# Patient Record
Sex: FEMALE | Race: OTHER | HISPANIC OR LATINO
[De-identification: names, ages, dates, MRNs, and addresses within clinical notes are randomized per-mention and may not be internally consistent; named-entity substitution may affect disease eponyms.]

---

## 2021-03-29 ENCOUNTER — APPOINTMENT (OUTPATIENT)
Dept: GASTROENTEROLOGY | Facility: CLINIC | Age: 56
End: 2021-03-29

## 2021-06-07 ENCOUNTER — RESULT REVIEW (OUTPATIENT)
Age: 56
End: 2021-06-07

## 2022-04-01 ENCOUNTER — EMERGENCY (EMERGENCY)
Facility: HOSPITAL | Age: 57
LOS: 1 days | Discharge: HOME | End: 2022-04-01
Attending: EMERGENCY MEDICINE
Payer: COMMERCIAL

## 2022-04-01 VITALS
TEMPERATURE: 98 F | DIASTOLIC BLOOD PRESSURE: 82 MMHG | OXYGEN SATURATION: 100 % | RESPIRATION RATE: 20 BRPM | SYSTOLIC BLOOD PRESSURE: 119 MMHG | HEART RATE: 69 BPM

## 2022-04-01 VITALS
WEIGHT: 207.9 LBS | DIASTOLIC BLOOD PRESSURE: 81 MMHG | HEART RATE: 85 BPM | HEIGHT: 64 IN | RESPIRATION RATE: 19 BRPM | SYSTOLIC BLOOD PRESSURE: 147 MMHG | OXYGEN SATURATION: 97 % | TEMPERATURE: 98 F

## 2022-04-01 PROCEDURE — 73030 X-RAY EXAM OF SHOULDER: CPT

## 2022-04-01 PROCEDURE — 73060 X-RAY EXAM OF HUMERUS: CPT

## 2022-04-01 PROCEDURE — 73030 X-RAY EXAM OF SHOULDER: CPT | Mod: 26,LT

## 2022-04-01 PROCEDURE — 70450 CT HEAD/BRAIN W/O DYE: CPT | Mod: MA

## 2022-04-01 PROCEDURE — 70450 CT HEAD/BRAIN W/O DYE: CPT | Mod: 26,MA

## 2022-04-01 PROCEDURE — 73000 X-RAY EXAM OF COLLAR BONE: CPT

## 2022-04-01 PROCEDURE — 73502 X-RAY EXAM HIP UNI 2-3 VIEWS: CPT | Mod: 26,LT

## 2022-04-01 PROCEDURE — 99284 EMERGENCY DEPT VISIT MOD MDM: CPT | Mod: 25

## 2022-04-01 PROCEDURE — 99284 EMERGENCY DEPT VISIT MOD MDM: CPT

## 2022-04-01 PROCEDURE — 73502 X-RAY EXAM HIP UNI 2-3 VIEWS: CPT

## 2022-04-01 PROCEDURE — 73000 X-RAY EXAM OF COLLAR BONE: CPT | Mod: 26,LT

## 2022-04-01 PROCEDURE — 73020 X-RAY EXAM OF SHOULDER: CPT

## 2022-04-01 PROCEDURE — 73060 X-RAY EXAM OF HUMERUS: CPT | Mod: 26,LT

## 2022-04-01 RX ORDER — ACETAMINOPHEN 500 MG
975 TABLET ORAL ONCE
Refills: 0 | Status: COMPLETED | OUTPATIENT
Start: 2022-04-01 | End: 2022-04-01

## 2022-04-01 RX ADMIN — Medication 975 MILLIGRAM(S): at 11:23

## 2022-04-01 RX ADMIN — Medication 975 MILLIGRAM(S): at 11:55

## 2022-04-01 NOTE — ED PROVIDER NOTE - ATTENDING CONTRIBUTION TO CARE
57F hx hypothyroid no AC or AP here sp slip and fall down ~10 steps with head strike, no LOC, no neck pain, c/o L shoulder and L hip pain, has been ambulatory. L parietal swelling no lac, no midline C spine TTP, no chest wall TTP, L proximal humerus deformity and TTP, distal 1/3 L clavicle TTP, FROM in L wrist elbow, dec ROM in L shoulder 2/2 pain. abd soft ntnd, pelvis stable, L posterior hip/glute TTP and bruising, FROM in BLE LE. CTH to r/o ICH, Xrays L shoulder and pelvis to r/o fx/dislocation, pain control.

## 2022-04-01 NOTE — ED PROVIDER NOTE - PHYSICAL EXAMINATION
General appearance: NAD, conversant, afebrile    Eyes: anicteric sclerae, LUIS, EOMI   HENT: L head hematoma; oropharynx clear, MMM and no ulcerations, no pharyngeal erythema or exudate   Neck: Trachea midline; Full range of motion, supple   Pulm: CTA bl, normal respiratory effort and no intercostal retractions, normal work of breathing   CV: RRR, No murmurs, rubs, or gallops.    Abdomen: Soft, non-tender, non-distended; no guarding or rebound   Extremities: L shoulder ROM limited to 90 deg lateral and anterior direction. difficult to assess deformity due to body habitus however asymmetrical to R shoulder. TTP anterior shoulder and distal clavicle. 5/5 strength in hand  and elbow, exam limited by pain for shoulder. L hip bruise, full rom, L lateral shin bruise, no bony ttp. 5/5 strength all other extremities, full ROM   Skin: Dry, normal temperature, turgor and texture; no rash, ulcers or subcutaneous nodules   Psych: Appropriate affect, cooperative; alert and oriented to person, place and time

## 2022-04-01 NOTE — ED PROVIDER NOTE - NSICDXPASTSURGICALHX_GEN_ALL_CORE_FT
PAST SURGICAL HISTORY:  FHx: AVERY-BSO (total abdominal hysterectomy and bilateral salpingo-oophorectomy) 3/1999    H/O colonoscopy with polypectomy 2013    Knowledge deficit on leg surgery 1995 - left femur - surgical excision of excess bony growth

## 2022-04-01 NOTE — ED PROVIDER NOTE - WR ORDER ID 3
All discharge, safety  and follow up with Md, verbalized understanding of all instructions [id number]

## 2022-04-01 NOTE — ED PROVIDER NOTE - NSICDXPASTMEDICALHX_GEN_ALL_CORE_FT
PAST MEDICAL HISTORY:  Cholelithiasis     Endometriosis     Hypothyroidism was on levothyroxine, has not taken since 3/2013 pending evaluation by PCP    Obesity (BMI 30-39.9)

## 2022-04-01 NOTE — ED PROVIDER NOTE - CARE PROVIDER_API CALL
Steve Danielle)  Orthopedics  [address] [place] [address]  Phone: [phone]  Fax: [phone]  Follow Up Time: 4-6 Days

## 2022-04-01 NOTE — ED PROVIDER NOTE - OBJECTIVE STATEMENT
58yo F w/ hx hypothyroidism presenting with shoulder pain. Pt fell down 10 stairs this morning. Hit head but no LOC, no AC use. She now has HA, nausea, L shoulder pain, and L hip pain. Ambulatory after fall and no dizziness. 58yo F w/ hx hypothyroidism presenting with shoulder pain. Pt fell down 10 carpeted wooden stairs this morning. Hit head but no LOC, no AC use. She now has HA, nausea, L shoulder pain, and L hip pain. Ambulatory after fall and no dizziness. No other complaints.

## 2022-04-01 NOTE — ED ADULT NURSE NOTE - NS ED NURSE RECORD ANOTHER VITAL SIGN
Pt is calling back asking if she needs to go back on the Calcium or not?  She was confused.  Please call [phone]   Yes

## 2022-04-01 NOTE — CONSULT NOTE ADULT - SUBJECTIVE AND OBJECTIVE BOX
IN PROGRESS Orthopedics    Patient is a 57yFemale RHD who presents to [facility] ED w/ a c/o of L shoulder pain and L buttock pain after falling down some stairs this morning. Denies preceding CP/SOB/headache/confusion/dizziness/palitations/weakness/fatigue. Endrosed Head trauma but no LOC. States ability to walk immediately following the injury. Denies any numbness or tingling. Denies any previous orthopedic history. No other orthopedic concerns at this time.    Hypothyroidism    Cholelithiasis    Endometriosis    Obesity (BMI 30-39.9)      No Known Allergies      PHYSICAL EXAM:  T(C): 36.7 (04-01-22 @ 13:54), Max: 36.8 (04-01-22 @ 11:06)  HR: 72 (04-01-22 @ 13:54) (72 - 85)  BP: 101/67 (04-01-22 @ 13:54) (101/67 - 147/81)  RR: 20 (04-01-22 @ 13:54) (19 - 20)  SpO2: 94% (04-01-22 @ 13:54) (94% - 97%)    Gen: NAD, Resting comfortably    LEFT Upper Extremity:   Skin intact, protuberance at the distal end of the clavicle.  TTP over the bony prominences of the distal clavicle  Painless passive/active ROM of the elbow/wrist/hand/fingers, Shoulder ROM painful  C5-T1 SILT  Motor grossly intact throughout axillary/musculocutaneous/radial/median/ulnar/AIN/PIN nerves  + radial pulse  Compartments soft and compressible    Secondary Survey:   No TTP over bony prominences, SILT, palpable pulses, full/painless A/PROM, compartments soft. No TTP over spinous processes or paraspinal muscles at C/T/L spine. No palpable step off. No other injuries or complaints.        A/P: 57F RHD w/ L AC joint separation    Case discussed and reviewed with Dr Cuadra  Images show L type 3 AC Joint separation  Reducible but unstable injury  No immediate need for ortho surgical intervention, but pt made aware of possible need for surgical intervention in the future.   Patient Can FU w/ Dr Cuadra but has plans to return to [place] in 2 weeks, can establish care with ortho surgeon of choice in [place]  Analgesia prn  NWB LUE in sling  PT/OT as tolerated, further instructions in office  Ice and elevate as tolerated  Orthopedically stable for discharge w/ plan to FU outpatient

## 2022-04-01 NOTE — ED PROVIDER NOTE - PATIENT PORTAL LINK FT
You can access the FollowMyHealth Patient Portal offered by [facility] by registering at the following website: [email or web address]. By joining [facility]’s FollowMyHealth portal, you will also be able to view your health information using other applications (apps) compatible with our system.

## 2022-04-01 NOTE — ED ADULT NURSE NOTE - NSIMPLEMENTINTERV_GEN_ALL_ED
Implemented All Fall with Harm Risk Interventions:  [place] to call system. Call bell, personal items and telephone within reach. Instruct patient to call for assistance. Room bathroom lighting operational. Non-slip footwear when patient is off stretcher. Physically safe environment: no spills, clutter or unnecessary equipment. Stretcher in lowest position, wheels locked, appropriate side rails in place. Provide visual cue, wrist band, yellow gown, etc. Monitor gait and stability. Monitor for mental status changes and reorient to person, place, and time. Review medications for side effects contributing to fall risk. Reinforce activity limits and safety measures with patient and family. Provide visual clues: red socks.

## 2022-04-01 NOTE — ED PROVIDER NOTE - PROGRESS NOTE DETAILS
Tre Bernard- XRr no fracture or dislocation. +AC joint separation. Consulted ortho, they will come see pt. Tre Bernard- michaela saw pt, placed pt in sling, to f/u with Dr. Danielle. spoke to pt about f/u and pain control, to make sure she keeps sling

## 2022-04-04 ENCOUNTER — APPOINTMENT (OUTPATIENT)
Dept: ORTHOPEDIC SURGERY | Facility: CLINIC | Age: 57
End: 2022-04-04
Payer: COMMERCIAL

## 2022-04-04 DIAGNOSIS — S43.102D UNSPECIFIED DISLOCATION OF LEFT ACROMIOCLAVICULAR JOINT, SUBSEQUENT ENCOUNTER: ICD-10-CM

## 2022-04-04 PROCEDURE — 99204 OFFICE O/P NEW MOD 45 MIN: CPT

## 2022-04-08 PROBLEM — S43.102D SEPARATION OF LEFT ACROMIOCLAVICULAR JOINT, SUBSEQUENT ENCOUNTER: Status: ACTIVE | Noted: 2022-04-04

## 2024-02-08 NOTE — ED ADULT NURSE NOTE - OBJECTIVE STATEMENT
immune patient received ambulatory. Alert & oriented x4. S/p fall about 10 steps down, landed on a rug on her left shoulder (+) pain. also pain to left hip & left side of head. Denies loc. No blood thinner.